# Patient Record
Sex: FEMALE | Employment: OTHER | URBAN - METROPOLITAN AREA
[De-identification: names, ages, dates, MRNs, and addresses within clinical notes are randomized per-mention and may not be internally consistent; named-entity substitution may affect disease eponyms.]

---

## 2019-05-20 ENCOUNTER — OFFICE VISIT (OUTPATIENT)
Dept: FAMILY MEDICINE CLINIC | Facility: CLINIC | Age: 34
End: 2019-05-20
Payer: MEDICAID

## 2019-05-20 ENCOUNTER — LAB ENCOUNTER (OUTPATIENT)
Dept: LAB | Facility: HOSPITAL | Age: 34
End: 2019-05-20
Attending: FAMILY MEDICINE
Payer: MEDICAID

## 2019-05-20 VITALS
OXYGEN SATURATION: 98 % | TEMPERATURE: 98 F | RESPIRATION RATE: 18 BRPM | DIASTOLIC BLOOD PRESSURE: 60 MMHG | SYSTOLIC BLOOD PRESSURE: 118 MMHG | WEIGHT: 169.06 LBS | BODY MASS INDEX: 26.53 KG/M2 | HEART RATE: 70 BPM | HEIGHT: 67 IN

## 2019-05-20 DIAGNOSIS — Z02.89 ENCOUNTER FOR PHYSICAL EXAMINATION RELATED TO EMPLOYMENT: ICD-10-CM

## 2019-05-20 DIAGNOSIS — Z82.49 FAMILY HISTORY OF HYPERTENSION: ICD-10-CM

## 2019-05-20 DIAGNOSIS — Z02.89 ENCOUNTER FOR PHYSICAL EXAMINATION RELATED TO EMPLOYMENT: Primary | ICD-10-CM

## 2019-05-20 PROCEDURE — 80053 COMPREHEN METABOLIC PANEL: CPT

## 2019-05-20 PROCEDURE — 99385 PREV VISIT NEW AGE 18-39: CPT | Performed by: FAMILY MEDICINE

## 2019-05-20 PROCEDURE — 84443 ASSAY THYROID STIM HORMONE: CPT

## 2019-05-20 PROCEDURE — 85025 COMPLETE CBC W/AUTO DIFF WBC: CPT

## 2019-05-20 PROCEDURE — 36415 COLL VENOUS BLD VENIPUNCTURE: CPT

## 2019-05-20 PROCEDURE — 86480 TB TEST CELL IMMUN MEASURE: CPT

## 2019-05-20 NOTE — PROGRESS NOTES
HPI: 35year old female presents for a general physical (NP). VSS. Denies fevers/chills, C.P., palpitations, dizziness, SOB, cough, abd pain, N/V/D, fatigue. No recent illness. Nml urine without dysuria or hematuria. Nml BM's. Weight is stable.  Defers STD Extremities normal, atraumatic, no cyanosis or edema. Neuro: Patellar reflexes 2+ B/L  Skin: Skin color, texture, turgor normal. No rashes or lesions. Lymph nodes: Cervical, supraclavicular nodes normal.    -defers TdaP vaccine  -no pap/Gardasil vaccine.

## 2019-05-24 ENCOUNTER — TELEPHONE (OUTPATIENT)
Dept: FAMILY MEDICINE CLINIC | Facility: CLINIC | Age: 34
End: 2019-05-24

## 2019-05-24 NOTE — TELEPHONE ENCOUNTER
----- Message from Emilee Colin DO sent at 5/23/2019 10:50 PM CDT -----  Nml CBC/CMP/TSH. Quant TB negative.

## 2019-05-29 NOTE — TELEPHONE ENCOUNTER
I read off dr Blanca Joseph test results and patient would like further tests with her Thyroid. She states her current TSH level has gone down since one's in the past.  Patient will send prior tsh, t3 and t4 test results via our fax tomorrow.   I asked patient

## 2019-06-19 NOTE — TELEPHONE ENCOUNTER
I left a message with the patient to please call me back. I have not received her test results from her other MD so we can compare the thyroid levels.

## 2020-10-02 ENCOUNTER — TELEPHONE (OUTPATIENT)
Dept: FAMILY MEDICINE CLINIC | Facility: CLINIC | Age: 35
End: 2020-10-02

## 2023-05-24 ENCOUNTER — TELEPHONE (OUTPATIENT)
Dept: FAMILY MEDICINE CLINIC | Facility: CLINIC | Age: 38
End: 2023-05-24

## 2023-05-26 ENCOUNTER — PATIENT OUTREACH (OUTPATIENT)
Dept: CASE MANAGEMENT | Age: 38
End: 2023-05-26

## 2023-05-26 NOTE — PROCEDURES
The office order for PCP removal request is Approved and finalized on May 26, 2023.     Thanks,  [facility] [employer]